# Patient Record
(demographics unavailable — no encounter records)

---

## 2024-10-30 NOTE — ASSESSMENT
[FreeTextEntry1] : Assessment: MAYLIN Obesity EDS controlled   PLAN: The patient is benefitting from the PAP device.  Patient is likely opening his mouth.  He is going to monitor his situation and will let me know if he wants to switch to a hybrid fullface mask New supplies will be ordered when required. Weight loss maintenance discussed. I stressed the need maintain compliance with the PAP device. The patient is not to use an Ozone or UV sterilizer. The patient was educated in the absolute requirement to use the PAP device nightly  F/U in 12 months

## 2024-10-30 NOTE — HISTORY OF PRESENT ILLNESS
[TextBox_4] : - Chief Complaint (CC) : The patient noticed that his CPAP machine, specifically its humidifier component, seems less powerful than before. He also feels his mouth getting dry when using the device. - History of Present Illness : Victor M was previously diagnosed with sleep apnea. He was prescribed with a CPAP machine to help manage his condition. However, Victor M reported some concerns regarding his CPAP machine's humidifier, which seemed to lessen its power. He stated that he has had to increase the settings of the humidifier as it doesn't appear as potent as in the past. He also occasionally wakes up with a dry mouth, indicating that he might be opening his mouth while sleeping. He had to add water to the humidifier every three to five days, and there seems to be a drop in the water levels of it.